# Patient Record
Sex: FEMALE | URBAN - METROPOLITAN AREA
[De-identification: names, ages, dates, MRNs, and addresses within clinical notes are randomized per-mention and may not be internally consistent; named-entity substitution may affect disease eponyms.]

---

## 2024-08-26 ENCOUNTER — APPOINTMENT (OUTPATIENT)
Dept: SURGERY | Facility: CLINIC | Age: 69
End: 2024-08-26
Payer: MEDICARE

## 2024-08-26 VITALS
OXYGEN SATURATION: 95 % | HEART RATE: 99 BPM | TEMPERATURE: 96.7 F | HEIGHT: 63 IN | DIASTOLIC BLOOD PRESSURE: 82 MMHG | BODY MASS INDEX: 36.14 KG/M2 | SYSTOLIC BLOOD PRESSURE: 146 MMHG | WEIGHT: 204 LBS

## 2024-08-26 DIAGNOSIS — K42.9 REDUCIBLE UMBILICAL HERNIA: Primary | ICD-10-CM

## 2024-08-26 PROCEDURE — 3008F BODY MASS INDEX DOCD: CPT | Performed by: PHYSICIAN ASSISTANT

## 2024-08-26 PROCEDURE — 99203 OFFICE O/P NEW LOW 30 MIN: CPT | Performed by: PHYSICIAN ASSISTANT

## 2024-08-26 PROCEDURE — 1036F TOBACCO NON-USER: CPT | Performed by: PHYSICIAN ASSISTANT

## 2024-08-26 RX ORDER — LOSARTAN POTASSIUM 50 MG/1
50 TABLET ORAL DAILY
COMMUNITY
Start: 2024-05-15

## 2024-08-26 RX ORDER — SEMAGLUTIDE 2.68 MG/ML
INJECTION, SOLUTION SUBCUTANEOUS
COMMUNITY
Start: 2024-07-12

## 2024-08-26 RX ORDER — ROSUVASTATIN CALCIUM 5 MG/1
TABLET, COATED ORAL
COMMUNITY
Start: 2024-03-25

## 2024-08-26 ASSESSMENT — LIFESTYLE VARIABLES: HOW OFTEN DO YOU HAVE A DRINK CONTAINING ALCOHOL: 2-4 TIMES A MONTH

## 2024-08-26 NOTE — PROGRESS NOTES
Subjective   Patient ID: Mary Colon is a 69 y.o. female who presents for Hernia (Pt is here for hernia ).    HPI  69-year-old female with a history of hypertension and parathyroid adenoma.  Patient is actually scheduled next month to get her parathyroid removed.  She is here because she had a laparoscopic appendectomy about 2 years ago and currently has a umbilical hernia.  She states when she lays down it goes away when she stands up it pops out and it is more towards her right side of her bellybutton.    Review of Systems  Review of systems is negative other than what is mentioned above      Physical Exam  Eyes: Conjunctiva non -icteric and eye lids are without obvious rash or drooping. Pupils are symmetric.   Ears, Nose, Mouth, and Throat: External ears and nose appear to be without deformity or rash. No lesions or masses noted. Hearing is grossly intact.   Neck:. No JVD noted, tracheal position is midline. No thyromegaly, no thyroid nodules  Head and Face: Examination of the head and face revealed no abnormalities.   Respiratory: No gasping or shortness of breath noted, no use of accessory muscles noted.  Lungs are clear to auscultate  Cardiovascular: Examination for edema is normal, regular rate and rhythm S1-S2  GI: Abdomen non tender to palpation, bowel sounds are present, patient has a 4-1/2 to 5 cm supraumbilical hernia towards the right side of her abdomen.  It is reducible  Skin: No rashes or open lesions/ulcers identified on skin.   Musk: Digits/nails show no clubbing or cyanosis. No asymmetry or masses noted of the musculature. Examination of the muscles/joints/bones show normal range of motion. Gait is grossly normally.   Neurologic: Cranial nerves II- XII intact, motor strength 5/5 muscle strength of the lower extremities bilaterally and equal.      Objective     No diagnosis found.   There is no problem list on file for this patient.     Not on File   Medication Documentation Review Audit     **Prior to Admission medications have not yet been reviewed**         No past medical history on file.  Social History     Tobacco Use   Smoking Status Former    Current packs/day: 0.00    Types: Cigarettes    Quit date:     Years since quittin.6   Smokeless Tobacco Never     No family history on file.   No past surgical history on file.    Assessment/Plan   Today we had a discussion about umbilical hernia repair.  Patient was instructed this would be done laparoscopically through 4 small incisions/ possible opened procedure.  Most likely a mesh will be inserted, patient instructed to general anesthesia.  Patient was instructed it would take 1 hour.  Patient will require a ride to and from the hospital.  Risk and benefits such as bleeding and infection were discussed.  Alternative measures were discussed with patient.  All questions were answered.  Patient would like to proceed.    Patient is currently scheduled to get a parathyroid adenoma removed in the next few weeks.  She was instructed she needed to wait at least 3 months to schedule any other surgeries.  Patient will return to the office in January to schedule her hernia repair.      Encounter Diagnosis   Name Primary?    Reducible umbilical hernia Yes     I have reviewed all data including labs,radiologic and previous reports.   **Portions of this medical record have been created using voice recognition software and may have minor errors which we are inherent in voice recognition systems it has not been fully edited for typographical or grammatical errors**    Yvette Ramos PA-C

## 2025-02-05 ENCOUNTER — APPOINTMENT (OUTPATIENT)
Dept: SURGERY | Facility: CLINIC | Age: 70
End: 2025-02-05
Payer: MEDICARE

## 2025-02-05 VITALS
OXYGEN SATURATION: 97 % | DIASTOLIC BLOOD PRESSURE: 88 MMHG | HEART RATE: 87 BPM | TEMPERATURE: 96.8 F | SYSTOLIC BLOOD PRESSURE: 132 MMHG

## 2025-02-05 DIAGNOSIS — K43.6 INCARCERATED VENTRAL HERNIA: Primary | ICD-10-CM

## 2025-02-05 PROCEDURE — 1159F MED LIST DOCD IN RCRD: CPT | Performed by: SURGERY

## 2025-02-05 PROCEDURE — 1036F TOBACCO NON-USER: CPT | Performed by: SURGERY

## 2025-02-05 PROCEDURE — 99213 OFFICE O/P EST LOW 20 MIN: CPT | Performed by: SURGERY

## 2025-02-05 PROCEDURE — 1160F RVW MEDS BY RX/DR IN RCRD: CPT | Performed by: SURGERY

## 2025-02-05 RX ORDER — ACETAMINOPHEN 500 MG
TABLET ORAL
COMMUNITY

## 2025-02-05 NOTE — PROGRESS NOTES
Subjective   Patient ID: Mary Colon is a 70 y.o. female who presents for Hernia (Hx appendectomy 24).    HPI previous assessment was done about 3 months ago, since then patient underwent parathyroidectomy.  There is a increase in the size of the hernia  Review of Systems integumentary consistent with a palpable nonreducible lump in the periumbilical/supraumbilical area, size 12 cm on external measurement.  Review of all other 10 system is negative  Physical Exam pupils equal bilaterally, mucosa moist, bilateral breath sounds, regular rhythm and rate, abdomen soft, mild tenderness in the area of palpable nonreducible 12 cm supraumbilical hernia.  Palpable peripheral pulse, no focal neurological motor deficits.  Musculoskeletal exam within normal limits, ENT exam within normal limits.    Objective I reviewed all available data including lab results, radiological studies, previous reports and notes.    No diagnosis found.   There is no problem list on file for this patient.     No Known Allergies   Medication Documentation Review Audit       Reviewed by Darwin Anders MD (Physician) on 25 at 1413      Medication Order Taking? Sig Documenting Provider Last Dose Status   cholecalciferol (Vitamin D3) 50 mcg (2,000 unit) capsule [id number]  Take by mouth. Historical Provider, MD  Active   losartan (Cozaar) 50 mg tablet [id number] Yes Take 1 tablet (50 mg) by mouth once daily. for blood pressure Historical Provider, MD  Active   Ozempic 2 mg/dose (8 mg/3 mL) pen injector [id number] Yes  Historical Provider, MD  Active   rosuvastatin (Crestor) 5 mg tablet [id number]     Patient not taking: Reported on 2025    Historical Provider, MD  Active                    History reviewed. No pertinent past medical history.  Social History     Tobacco Use   Smoking Status Former    Current packs/day: 0.00    Types: Cigarettes    Quit date:     Years since quittin.1   Smokeless Tobacco Never     No family  history on file.   History reviewed. No pertinent surgical history.    Assessment/Plan   Patient with incarcerated supraumbilical hernia, 12 cm.  The patient has indication for laparoscopic, possible open supraumbilical incarcerated hernia repair.  Risks, benefits, alternative treatment were explained to the patient.  All questions were answered.  Informed consent was obtained.      Darwin Anders MD

## 2025-02-06 ENCOUNTER — ANCILLARY PROCEDURE (OUTPATIENT)
Dept: URGENT CARE | Age: 70
End: 2025-02-06
Payer: MEDICARE

## 2025-02-06 ENCOUNTER — OFFICE VISIT (OUTPATIENT)
Dept: URGENT CARE | Age: 70
End: 2025-02-06
Payer: MEDICARE

## 2025-02-06 VITALS
OXYGEN SATURATION: 95 % | HEIGHT: 62 IN | RESPIRATION RATE: 20 BRPM | SYSTOLIC BLOOD PRESSURE: 137 MMHG | TEMPERATURE: 98 F | HEART RATE: 60 BPM | WEIGHT: 190 LBS | BODY MASS INDEX: 34.96 KG/M2 | DIASTOLIC BLOOD PRESSURE: 75 MMHG

## 2025-02-06 DIAGNOSIS — S69.91XA RIGHT WRIST INJURY, INITIAL ENCOUNTER: Primary | ICD-10-CM

## 2025-02-06 DIAGNOSIS — S69.91XA RIGHT WRIST INJURY, INITIAL ENCOUNTER: ICD-10-CM

## 2025-02-06 DIAGNOSIS — S52.124A NONDISPLACED FRACTURE OF HEAD OF RIGHT RADIUS, INITIAL ENCOUNTER FOR CLOSED FRACTURE: ICD-10-CM

## 2025-02-06 PROCEDURE — 73110 X-RAY EXAM OF WRIST: CPT | Mod: RIGHT SIDE | Performed by: FAMILY MEDICINE

## 2025-02-06 ASSESSMENT — ENCOUNTER SYMPTOMS
LOSS OF SENSATION IN FEET: 0
OCCASIONAL FEELINGS OF UNSTEADINESS: 0
DEPRESSION: 0

## 2025-02-06 NOTE — PROGRESS NOTES
"Subjective   Patient ID: Mary Colon is a 70 y.o. female. They present today with a chief complaint of Injury (Broken wrist ) and Fall (Today on ice - was pulled by dog,/Right wrist pain , lower back a little sore - did not hit head ).    History of Present Illness  HPI  70-year-old female presents after falling and injuring her wrist.  No cuts or abrasions are reported.  No head or neck injury reported.  Past Medical History  Allergies as of 02/06/2025    (No Known Allergies)       (Not in a hospital admission)       No past medical history on file.    No past surgical history on file.     reports that she quit smoking about 36 years ago. Her smoking use included cigarettes. She has never been exposed to tobacco smoke. She has never used smokeless tobacco. She reports current alcohol use. She reports that she does not use drugs.    Review of Systems  Review of Systems                               Objective    Vitals:    02/06/25 1047   BP: 137/75   Pulse: 60   Resp: 20   Temp: 36.7 °C (98 °F)   SpO2: 95%   Weight: 86.2 kg (190 lb)   Height: 1.575 m (5' 2\")     No LMP recorded (lmp unknown). Patient is postmenopausal.    Physical Exam  General: Vitals noted, no distress. Afebrile.   Vascular: Right upper extremity warm and dry with good peripheral pulses noted  Extremities: No peripheral edema.  Somewhat decreased wrist extension and flexion secondary to wrist pain.  Tenderness and swelling noted over lateral aspect of right wrist with no palpable or visible deformity  Skin: No rash. No bruising or discoloration. No cuts or abrasions  Neuro: No focal neurologic deficits, NIH score of 0.    Procedures    Point of Care Test & Imaging Results from this visit  No results found for this visit on 02/06/25.   XR wrist right 3+ views    Result Date: 2/6/2025  Interpreted By:  Cezar Cueto, STUDY: XR WRIST RIGHT 3+ VIEWS   INDICATION: Signs/Symptoms:Right wrist injury.   COMPARISON: None   ACCESSION NUMBER(S): " [id number]   ORDERING CLINICIAN: HELADIO WALKER   FINDINGS: Nondisplaced transverse right distal radial fracture with some dorsal buckling.   Carpal osteoarthritis. No dislocation.       Nondisplaced right distal radial fracture.   Signed by: Cezar Cueto 2/6/2025 11:22 AM Dictation workstation:   [id number]     Diagnostic study results (if any) were reviewed by Heladio Walker MD.    Assessment/Plan   Allergies, medications, history, and pertinent labs/EKGs/Imaging reviewed by Heladio Walker MD.     Medical Decision Making  At time of discharge patient was clinically well-appearing and HDS for outpatient management. The patient and/or family was educated regarding diagnosis, supportive care, OTC and Rx medications. The patient and/or family was given the opportunity to ask questions prior to discharge.  They verbalized understanding of my discussion of the plans for treatment, expected course, indications to return to  or seek further evaluation in ED, and the need for timely follow up as directed.   They were provided with a work/school excuse if requested.    Orders and Diagnoses  Diagnoses and all orders for this visit:  Right wrist injury, initial encounter  -     XR wrist right 3+ views; Future  Nondisplaced fracture of head of right radius, initial encounter for closed fracture      Medical Admin Record      Patient disposition: Home    Electronically signed by Heladio Walker MD  11:30 AM

## 2025-02-06 NOTE — PATIENT INSTRUCTIONS
Elevate and rest wrist as able  Ibuprofen, or naproxyn, plus Tylenol as needed  Apply ice several times a day  Elastic wrist support as discussed   follow-up with orthopedics

## 2025-02-10 ENCOUNTER — OFFICE VISIT (OUTPATIENT)
Dept: ORTHOPEDIC SURGERY | Facility: CLINIC | Age: 70
End: 2025-02-10
Payer: MEDICARE

## 2025-02-10 VITALS — BODY MASS INDEX: 33.66 KG/M2 | WEIGHT: 190 LBS | HEIGHT: 63 IN

## 2025-02-10 DIAGNOSIS — S52.531A CLOSED COLLES' FRACTURE OF RIGHT RADIUS, INITIAL ENCOUNTER: Primary | ICD-10-CM

## 2025-02-10 PROCEDURE — 1159F MED LIST DOCD IN RCRD: CPT | Performed by: ORTHOPAEDIC SURGERY

## 2025-02-10 PROCEDURE — 3008F BODY MASS INDEX DOCD: CPT | Performed by: ORTHOPAEDIC SURGERY

## 2025-02-10 PROCEDURE — 25600 CLTX DST RDL FX/EPHYS SEP WO: CPT | Performed by: ORTHOPAEDIC SURGERY

## 2025-02-10 PROCEDURE — 1036F TOBACCO NON-USER: CPT | Performed by: ORTHOPAEDIC SURGERY

## 2025-02-10 ASSESSMENT — ENCOUNTER SYMPTOMS
OCCASIONAL FEELINGS OF UNSTEADINESS: 1
DEPRESSION: 0
LOSS OF SENSATION IN FEET: 0

## 2025-02-10 NOTE — PROGRESS NOTES
Subjective    Patient ID: Mary Colon is a 70 y.o. female.    Chief Complaint: Fracture of the Right Wrist     Last Surgery: No surgery found  Last Surgery Date: No surgery found    HPI  The patient is a 70-year-old right-hand-dominant female who comes in after sustaining an injury to her right wrist on February 6, 2025.  The intracardial when she slipped and landed on outstretched right upper extremity.  She was seen in urgent care where she was found to have sustained a nondisplaced right distal radius fracture.  She was placed into a splint.  She comes in today for first follow-up.  She denies numbness and paresthesias.  She does not have any other complaints of pain.    Objective   Ortho Exam  The patient is in no acute distress.  Exam of her right wrist reveals her skin envelope is intact.  She is tender over the distal radius.  She has no tenderness over the distal ulna.  She has appropriate function of her EPL and FPL.  Image Results:  XR wrist right 3+ views  Narrative: Interpreted By:  Cezar Cueto,   STUDY:  XR WRIST RIGHT 3+ VIEWS      INDICATION:  Signs/Symptoms:Right wrist injury.      COMPARISON:  None      ACCESSION NUMBER(S):  [id number]      ORDERING CLINICIAN:  KINGS WALKER      FINDINGS:  Nondisplaced transverse right distal radial fracture with some dorsal  buckling.      Carpal osteoarthritis. No dislocation.      Impression: Nondisplaced right distal radial fracture.      Signed by: Cezar Cueto 2/6/2025 11:22 AM  Dictation workstation:   [id number]      Assessment/Plan   Encounter Diagnoses:  Closed Colles' fracture of right radius, initial encounter    Patient has a right distal radius fracture likely treated conservatively.  She may continue with her fracture brace from urgent care.  She will follow-up in 2 weeks with x-rays of her right wrist.

## 2025-02-20 PROBLEM — S52.531A FRACTURE, COLLES, RIGHT, CLOSED: Status: ACTIVE | Noted: 2025-02-20

## 2025-02-24 ENCOUNTER — HOSPITAL ENCOUNTER (OUTPATIENT)
Dept: RADIOLOGY | Facility: CLINIC | Age: 70
Discharge: HOME | End: 2025-02-24
Payer: MEDICARE

## 2025-02-24 ENCOUNTER — OFFICE VISIT (OUTPATIENT)
Dept: ORTHOPEDIC SURGERY | Facility: CLINIC | Age: 70
End: 2025-02-24
Payer: MEDICARE

## 2025-02-24 DIAGNOSIS — S52.531A CLOSED COLLES' FRACTURE OF RIGHT RADIUS, INITIAL ENCOUNTER: ICD-10-CM

## 2025-02-24 DIAGNOSIS — S52.531A CLOSED COLLES' FRACTURE OF RIGHT RADIUS, INITIAL ENCOUNTER: Primary | ICD-10-CM

## 2025-02-24 PROCEDURE — 99211 OFF/OP EST MAY X REQ PHY/QHP: CPT | Performed by: ORTHOPAEDIC SURGERY

## 2025-02-24 PROCEDURE — 73110 X-RAY EXAM OF WRIST: CPT | Mod: RIGHT SIDE | Performed by: RADIOLOGY

## 2025-02-24 PROCEDURE — 73110 X-RAY EXAM OF WRIST: CPT | Mod: RT

## 2025-02-24 NOTE — PROGRESS NOTES
Subjective    Patient ID: Mary Colon is a 70 y.o. female.    Chief Complaint: Fracture and Follow-up of the Right Wrist     Last Surgery: No surgery found  Last Surgery Date: No surgery found    HPI  The patient comes in for follow-up of her right distal radius fracture being treated conservatively.  She has been diligent wearing her brace.  She denies any trauma to her right hand.  She states her pain is slowly improving.  Objective   Ortho Exam  Exam of the patient's right wrist reveals her skin envelope is intact.  She has appropriate function of her EPL and FPL.  She is  over the distal radius.    Image Results:  X-rays of the patient's right wrist were personally reviewed.  Compared to previous x-rays she has not lost radial height but has fallen into about 5 degrees of dorsal angulation.    Assessment/Plan   Encounter Diagnoses:  Closed Colles' fracture of right radius, initial encounter    Orders Placed This Encounter    XR wrist right 3+ views     The patient has a right distal wrist fracture which did dorsally angulated compared to the original injury film.  At this time however we will continue with conservative management.  The patient will follow-up in 4 weeks with x-rays of her right wrist.

## 2025-03-12 ENCOUNTER — TELEPHONE (OUTPATIENT)
Dept: SURGERY | Facility: CLINIC | Age: 70
End: 2025-03-12
Payer: MEDICARE

## 2025-03-12 NOTE — TELEPHONE ENCOUNTER
Pt requested a post op appt. Unable to schedule for 3/20/25. Offered to schedule for 4/07/25, and asked to call office before if pt experienced any complications. Pt declined an appt if she wasn't going to be seen before 3/20/25. She stated she would go to her PCP appt on 3/28/25.

## 2025-03-12 NOTE — TELEPHONE ENCOUNTER
Called to offer an appt for today at 1;30 pm. Pt declined and stated an NP went to her house today to check on her, and that she was healing okay. Pt thanked for the call.

## 2025-03-24 ENCOUNTER — HOSPITAL ENCOUNTER (OUTPATIENT)
Dept: RADIOLOGY | Facility: CLINIC | Age: 70
Discharge: HOME | End: 2025-03-24
Payer: MEDICARE

## 2025-03-24 ENCOUNTER — OFFICE VISIT (OUTPATIENT)
Dept: ORTHOPEDIC SURGERY | Facility: CLINIC | Age: 70
End: 2025-03-24
Payer: MEDICARE

## 2025-03-24 DIAGNOSIS — S52.531A CLOSED COLLES' FRACTURE OF RIGHT RADIUS, INITIAL ENCOUNTER: ICD-10-CM

## 2025-03-24 DIAGNOSIS — S52.531A CLOSED COLLES' FRACTURE OF RIGHT RADIUS, INITIAL ENCOUNTER: Primary | ICD-10-CM

## 2025-03-24 PROCEDURE — 73110 X-RAY EXAM OF WRIST: CPT | Mod: RIGHT SIDE | Performed by: RADIOLOGY

## 2025-03-24 PROCEDURE — 99211 OFF/OP EST MAY X REQ PHY/QHP: CPT | Performed by: ORTHOPAEDIC SURGERY

## 2025-03-24 PROCEDURE — 1159F MED LIST DOCD IN RCRD: CPT | Performed by: ORTHOPAEDIC SURGERY

## 2025-03-24 PROCEDURE — 73110 X-RAY EXAM OF WRIST: CPT | Mod: RT

## 2025-03-24 NOTE — PROGRESS NOTES
Subjective    Patient ID: Mary Colon is a 70 y.o. female.    Chief Complaint: Follow-up (F/U RT WRIST FX/DOI: 2/6/25/)     Last Surgery: No surgery found  Last Surgery Date: No surgery found    HPI  Patient returns today for follow-up of her right distal radius fracture.  This has been treated conservatively.  She states that over the past 2-week she has noticed improvement in pain.  Objective   Ortho Exam  The patient is in no acute distress.  Exam of her right wrist reveals her skin envelope is intact.  She has minimal tenderness to palpation over the fracture site itself.  She has appropriate function of her EPL and FPL.  She is neurovascular intact to her median, radial ulnar nerves.  She has 90 degrees of pronation and about 80 degrees of supination.  She has about 100 degree arc of flexion and extension.    Image Results:  X-rays of the patient's right wrist were personally reviewed today.  She has evidence of healing across the fracture site.  The fracture has healed in adequate alignment being just about in neutral on the lateral view.    Assessment/Plan   Encounter Diagnoses:  Closed Colles' fracture of right radius, initial encounter    Orders Placed This Encounter    XR wrist right 3+ views     Patient has clinical and radiographic evidence of a healed right distal radius fracture.  She no longer needs to wear her brace.  She may start using right upper extremity is much as she can tolerate.  She will follow-up as her symptoms dictate.

## 2025-05-27 ENCOUNTER — APPOINTMENT (OUTPATIENT)
Dept: OPHTHALMOLOGY | Facility: CLINIC | Age: 70
End: 2025-05-27
Payer: MEDICARE